# Patient Record
Sex: FEMALE | Race: OTHER | HISPANIC OR LATINO
[De-identification: names, ages, dates, MRNs, and addresses within clinical notes are randomized per-mention and may not be internally consistent; named-entity substitution may affect disease eponyms.]

---

## 2022-05-04 PROBLEM — Z00.00 ENCOUNTER FOR PREVENTIVE HEALTH EXAMINATION: Status: ACTIVE | Noted: 2022-05-04

## 2022-06-28 ENCOUNTER — APPOINTMENT (OUTPATIENT)
Dept: NEUROLOGY | Facility: CLINIC | Age: 22
End: 2022-06-28

## 2022-06-28 VITALS
DIASTOLIC BLOOD PRESSURE: 80 MMHG | SYSTOLIC BLOOD PRESSURE: 121 MMHG | TEMPERATURE: 98.7 F | WEIGHT: 160 LBS | OXYGEN SATURATION: 98 % | HEIGHT: 66 IN | BODY MASS INDEX: 25.71 KG/M2 | HEART RATE: 81 BPM

## 2022-06-28 DIAGNOSIS — E55.9 VITAMIN D DEFICIENCY, UNSPECIFIED: ICD-10-CM

## 2022-06-28 PROCEDURE — 99204 OFFICE O/P NEW MOD 45 MIN: CPT

## 2022-06-30 NOTE — REASON FOR VISIT
[Initial Evaluation] : an initial evaluation [Other: _____] : [unfilled] [Interpreters_IDNumber] : [id number] [Interpreters_FullName] : Aishwarya [TWNoteComboBox1] : Zimbabwean

## 2022-06-30 NOTE — DISCUSSION/SUMMARY
[FreeTextEntry1] : Carmen is a pleasant 21-year-old right-handed young lady with a past medical history of epilepsy. 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 Appears to be bilateral tonic clonic seizures - not clear if focal onset. On phenytoin 100 mg ER daily. No seizures in the past one year. Does not drive. Will continue phenytoin 100 mg ER daily for now but discussed that this is not an ideal medication in young woman of childbearing age. Will need to change medication but first will get EEG to get more information. \par

## 2022-06-30 NOTE — PHYSICAL EXAM
[FreeTextEntry1] : Deferred due to COVID precautions\par General: No acute distress \par Good historian \par EOMI, face activates symmetrically

## 2022-06-30 NOTE — ASSESSMENT
[FreeTextEntry1] : Please continue phenytoin 100 mg ER daily \par Please get routine and ambulatory EEG\par Please get labs \par return to clinic in one month\par MRI brain without contrast at next visit \par \par Call if seizure

## 2022-06-30 NOTE — HISTORY OF PRESENT ILLNESS
[FreeTextEntry1] : Carmen is a 21-year-old right-handed woman with a history of epilepsy. Presenting to clinic to establish care. Unfortunately, no records are available for review. She is Andor[place] speaking, originally from [place]. She presents with her boyfriend's mother. \par \par She thinks her last seizure was one year ago. She is prescribed epamin (phenytoin XR). \par \par Seizure semiology: headaches, dizziness, cramping of extremities, left eye twitching, jumping, she feels she can't respond, feels stiff in arms and legs,  body starts shaking, no loss of urine, no tongue bite \par Seizure frequency: 2 times a month for many years; sometimes she would go a couple months without a seizure and then would have 3 or 4 seizures in one day\par Intubation history: never intubated\par Seizure onset: around 4 or 5 years of age \par History of hospitalizations: once spent one week in hospital \par Seizure risk: No known history of neurocysticercosis \par \par \par Medications:\par epamin r 100 mg daily (phenytoin )\par \par no other medications\par \par Surgeries: \par none \par \par Social HIstory: \par From [place]\par lives in [place] with her partner    \par Works in house cleaning\par Does not drive\par Does not smoke, no alcohol, no drugs \par Highschool education \par \par _________________________\par \par Family History \par Mother: seizures, after children it passed\par Father: no known medical history \par Brother, sisters: none \par \par Birth history\par Term birth \par No nuchal cord \par No developmental delay \par Seizures started when she was 4 or 5 years old \par no known brain infection or encephalitis \par no head injury, no loss of consciousness \par \par \par \par \par \par \par \par \par

## 2022-06-30 NOTE — HISTORY OF PRESENT ILLNESS
[FreeTextEntry1] : Carmen is a 21-year-old right-handed woman with a history of epilepsy. Presenting to clinic to establish care. Unfortunately, no records are available for review. She is Kenyan speaking, originally from [place]. She presents with her boyfriend's mother. \par \par She thinks her last seizure was one year ago. She is prescribed epamin (phenytoin XR). \par \par Seizure semiology: headaches, dizziness, cramping of extremities, left eye twitching, jumping, she feels she can't respond, feels stiff in arms and legs,  body starts shaking, no loss of urine, no tongue bite \par Seizure frequency: 2 times a month for many years; sometimes she would go a couple months without a seizure and then would have 3 or 4 seizures in one day\par Intubation history: never intubated\par Seizure onset: around 4 or 5 years of age \par History of hospitalizations: once spent one week in hospital \par Seizure risk: No known history of neurocysticercosis \par \par \par Medications:\par epamin r 100 mg daily (phenytoin )\par \par no other medications\par \par Surgeries: \par none \par \par Social HIstory: \par From [facility]\par lives in [place] with her partner    \par Works in house cleaning\par Does not drive\par Does not smoke, no alcohol, no drugs \par Highschool education \par \par _________________________\par \par Family History \par Mother: seizures, after children it passed\par Father: no known medical history \par Brother, sisters: none \par \par Birth history\par Term birth \par No nuchal cord \par No developmental delay \par Seizures started when she was 4 or 5 years old \par no known brain infection or encephalitis \par no head injury, no loss of consciousness \par \par \par \par \par \par \par \par \par

## 2022-06-30 NOTE — REASON FOR VISIT
[Initial Evaluation] : an initial evaluation [Other: _____] : [unfilled] [Interpreters_IDNumber] : [id number] [Interpreters_FullName] : Aishwarya [TWNoteComboBox1] : Taiwanese

## 2022-07-08 ENCOUNTER — APPOINTMENT (OUTPATIENT)
Dept: NEUROLOGY | Facility: CLINIC | Age: 22
End: 2022-07-08

## 2022-07-08 DIAGNOSIS — E53.8 DEFICIENCY OF OTHER SPECIFIED B GROUP VITAMINS: ICD-10-CM

## 2022-07-08 LAB
25(OH)D3 SERPL-MCNC: 22.8 NG/ML
ALBUMIN SERPL ELPH-MCNC: 4.7 G/DL
ALP BLD-CCNC: 130 U/L
ALT SERPL-CCNC: 10 U/L
ANION GAP SERPL CALC-SCNC: 13 MMOL/L
AST SERPL-CCNC: 18 U/L
BILIRUB SERPL-MCNC: <0.2 MG/DL
BUN SERPL-MCNC: 13 MG/DL
CALCIUM SERPL-MCNC: 10.3 MG/DL
CHLORIDE SERPL-SCNC: 103 MMOL/L
CO2 SERPL-SCNC: 24 MMOL/L
CREAT SERPL-MCNC: 0.66 MG/DL
EGFR: 127 ML/MIN/1.73M2
FOLATE SERPL-MCNC: 4.3 NG/ML
GLUCOSE SERPL-MCNC: 97 MG/DL
PHENYTOIN SERPL QL: 2.6 UG/ML
POTASSIUM SERPL-SCNC: 5 MMOL/L
PROT SERPL-MCNC: 7.5 G/DL
SODIUM SERPL-SCNC: 139 MMOL/L
VIT B12 SERPL-MCNC: 568 PG/ML

## 2022-07-08 PROCEDURE — 95816 EEG AWAKE AND DROWSY: CPT

## 2022-07-09 PROCEDURE — 95708 EEG WO VID EA 12-26HR UNMNTR: CPT

## 2022-07-09 PROCEDURE — 95719 EEG PHYS/QHP EA INCR W/O VID: CPT

## 2022-07-09 PROCEDURE — 95700 EEG CONT REC W/VID EEG TECH: CPT

## 2022-07-11 ENCOUNTER — APPOINTMENT (OUTPATIENT)
Dept: NEUROLOGY | Facility: CLINIC | Age: 22
End: 2022-07-11

## 2022-08-12 ENCOUNTER — APPOINTMENT (OUTPATIENT)
Dept: NEUROLOGY | Facility: CLINIC | Age: 22
End: 2022-08-12

## 2022-08-12 VITALS
OXYGEN SATURATION: 99 % | BODY MASS INDEX: 25.71 KG/M2 | HEART RATE: 79 BPM | SYSTOLIC BLOOD PRESSURE: 118 MMHG | WEIGHT: 160 LBS | DIASTOLIC BLOOD PRESSURE: 80 MMHG | HEIGHT: 66 IN | TEMPERATURE: 98.7 F

## 2022-08-12 PROCEDURE — 99215 OFFICE O/P EST HI 40 MIN: CPT

## 2022-08-12 NOTE — DATA REVIEWED
[de-identified] : 6/2022: Ambulatory EEG/routine EEG: normal (ciganek rhythm) [de-identified] : Labs: 6/28/22: b12 568, folate 4.3 low, vitamin D. 22.8 low , comprehensive metabolic panel - alk phos 130 , creatinine 0.66 , phenytoin 2.6 low \par

## 2022-08-12 NOTE — DISCUSSION/SUMMARY
[FreeTextEntry1] : Carmen is a pleasant 21-year-old right-handed young lady with a past medical history of epilepsy. Details are unknown. Appears to be bilateral tonic clonic seizures - not clear if focal onset - no warning before. Routine and ambulatory EEG were normal - possible genetic generalized epilepsy less likely - unclear. \par \par  On phenytoin 100 mg ER daily. No seizures in the past one year. Does not drive. Plan to transition off phenytoin and add lamotrigine. Counseled extensively on seizure safety - use of barrier protection. Importance of taking folate.

## 2022-08-12 NOTE — PHYSICAL EXAM
[FreeTextEntry1] : Mental Status: alert, speech fluent. Slightly flattened affect \par CN: visual acuity, VFF, blink to confrontation \par III, IV, VI, PERRL, EOMI \par V sensation normal to light touch, pinprick\par VII normal squint vs resistance, normal smile, face symmetric \par VIII: normal hearing \par IX, X normal gag, symmetric palate, uvula raises midline \par XI normal shrug versus resistance and lateralization of head versus resistance \par XII tongue symmetric, normal strength, no tremor fasciculations \par Motor: full strength throughout \par Sensory: normal to LT \par Reflexes \par Brachioradialis, biceps, triceps 2+, patella 3+ and ankles 2+ \par Plantar flexor response bilaterally \par Coordination: no dysmetria on FNF \par Gait : normal balance and gait \par

## 2022-08-12 NOTE — ASSESSMENT
[FreeTextEntry1] : Continue Phenytoin 100 mg ER (Epamin)\par Please also start a new medication Lamotrigine (lamictal)\par \par Start lamotrigine: \par Week 1: lamotrigine 25 mg every other day\par Week 2: lamotrigine 25 mg daily \par Week 3: lamotrigine 25 mg in the morning and 50 mg at night \par Week 4: lamotrigine 50 mg in the morning and 50 mg at night \par Week 5: lamotrigine 75 mg in the morning and 50 mg at night \par Week 6: lamotrigine 75 mg twice a day\par Week 7: lamotrigine 100 mg in the morning and 75 mg at night \par Week 8: lamotrigine 100 mg bid \par \par Monitor for rash \par Stop immediately if rash\par Go the emergency room if there is a rash \par \par ________________________________________________\par \par Take Vitamin D 84150 units once a week for six weeks - then take vitamin D 1000 units daily\par Take folic acid 1 mg daily \par Please use condoms if sexually active \par \par Return to clinic in one month to see Dr. Thomas \par \par

## 2022-08-12 NOTE — REASON FOR VISIT
[Follow-Up: _____] : a [unfilled] follow-up visit [Interpreters_IDNumber] : [id number] [Interpreters_FullName] : Landy [TWNoteComboBox1] : Tanzanian

## 2022-08-12 NOTE — HISTORY OF PRESENT ILLNESS
[FreeTextEntry1] :  used. Number is [id number]\par \par Presenting to clinic with boyfriends mother. No interval events concerning for seizure. Here to review results of EEG. Last seizure more than a year ago. No warning before events. Taking phenytoin XR (epamin). Not taking high dose vitamin d, taking own supply, does not know dose. Taking folic acid. Sexually active. In future, would like children. \par ______________\par 6/28/22 \par Carmen is a 21-year-old right-handed woman with a history of epilepsy. Presenting to clinic to establish care. Unfortunately, no records are available for review. She is [place] speaking, [place] She presents with her boyfriend's mother. \par \par She thinks her last seizure was one year ago. She is prescribed epamin (phenytoin XR). \par \par Seizure semiology: headaches, dizziness, cramping of extremities, left eye twitching, jumping, she feels she can't respond, feels stiff in arms and legs,  body starts shaking, no loss of urine, no tongue bite \par Seizure frequency: 2 times a month for many years; sometimes she would go a couple months without a seizure and then would have 3 or 4 seizures in one day\par Intubation history: never intubated\par Seizure onset: around 4 or 5 years of age \par History of hospitalizations: once spent one week in hospital \par \par Medications:\par epamin r 100 mg daily (phenytoin )\par \par no other medications\par \par Surgeries: \par none \par \par Social HIstory: \[place] [facility]\par lives in [place] with her partner    \par Works in house cleaning\par Does not drive\par Does not smoke, no alcohol, no drugs \par Highschool education \par \par _________________________\par \par Family History \par Mother: seizures, after children it passed\par Father: no known medical history \par Brother, sisters: none \par \par Birth history\par Term birth \par No nuchal cord \par No developmental delay \par Seizures started when she was 4 or 5 years old \par no known brain infection or encephalitis \par no neurocysticercosis \par no head injury, no loss of consciousness \par \par \par \par \par \par \par \par \par

## 2022-09-22 ENCOUNTER — APPOINTMENT (OUTPATIENT)
Dept: NEUROLOGY | Facility: CLINIC | Age: 22
End: 2022-09-22

## 2022-09-22 VITALS
BODY MASS INDEX: 25.71 KG/M2 | HEIGHT: 66 IN | WEIGHT: 160 LBS | SYSTOLIC BLOOD PRESSURE: 107 MMHG | DIASTOLIC BLOOD PRESSURE: 74 MMHG | HEART RATE: 80 BPM

## 2022-09-22 PROCEDURE — 99215 OFFICE O/P EST HI 40 MIN: CPT

## 2022-09-22 NOTE — ASSESSMENT
[FreeTextEntry1] : Continue to increase lamotrigine\par \par Week 1: lamotrigine 75 mg in morning and 50 mg at night\par Week 2: lamotrigine 75 mg in morning and 75 mg at night\par Week 3: lamotrigine 100 mg in morning and 75 mg at night \par Week 4: lamotrigine 100 mg twice a day \par Continue at this dose \par __________________________\par \par Continue phenytoin 100 mg ER daily (Epamin)\par \par Take folic acid 1 mg daily \par \par Take vitamin D 95978 units once a week for another six weeks\par Please use condoms or birth control if sexually active \par \par Return to clinic in one month - permission to overbook

## 2022-09-22 NOTE — PHYSICAL EXAM
[FreeTextEntry1] : Mental Status: alert, speech fluent. Normal affect. \par CN: visual acuity, VFF, blink to confrontation \par III, IV, VI, PERRL, EOMI \par V sensation normal to light touch, pinprick\par VII normal squint vs resistance, normal smile, face symmetric \par VIII: normal hearing \par IX, X normal gag, symmetric palate, uvula raises midline \par XI normal shrug versus resistance and lateralization of head versus resistance \par XII tongue symmetric, normal strength, no tremor fasciculations \par Motor: full strength throughout \par Sensory: normal to LT \par Reflexes \par Brachioradialis, biceps, triceps 2+, patella 2+ and ankles 2+ \par Plantar flexor response bilaterally \par Coordination: no dysmetria on FNF \par Gait : normal balance and gait \par

## 2022-09-22 NOTE — HISTORY OF PRESENT ILLNESS
[FreeTextEntry1] : Last seen in clinic on 8/12/22. Doing well. No interval events concerning for seizure. Taking lamotrigine 75 mg in the morning and 50 mg at night. No rash, no side effects.  Also taking Epamin (although level was low) - denies missing doses.  Takes folic acid 1 mg daily. Takes vitamin D 74004 q weekly. No seizures. Not driving. \par ______________________________________________________________\par  used. Number is [id number]\par \par Presenting to clinic with boyfriends mother. No interval events concerning for seizure. Here to review results of EEG. Last seizure more than a year ago. No warning before events. Taking phenytoin XR (epamin). Not taking high dose vitamin d, taking own supply, does not know dose. Taking folic acid. Sexually active. In future, would like children. \par ______________\par 6/28/22 \par Carmen is a 21-year-old right-handed woman with a history of epilepsy. Presenting to clinic to establish care. Unfortunately, no records are available for review. She is Lao speaking, originally from [facility] [place]. She presents with her boyfriend's mother. \par \par She thinks her last seizure was one year ago. She is prescribed epamin (phenytoin XR). \par \par Seizure semiology: headaches, dizziness, cramping of extremities, left eye twitching, jumping, she feels she can't respond, feels stiff in arms and legs,  body starts shaking, no loss of urine, no tongue bite \par Seizure frequency: 2 times a month for many years; sometimes she would go a couple months without a seizure and then would have 3 or 4 seizures in one day\par Intubation history: never intubated\par Seizure onset: around 4 or 5 years of age \par History of hospitalizations: once spent one week in hospital \par \par Medications:\par epamin r 100 mg daily (phenytoin )\par \par no other medications\par \par Surgeries: \par none \par \par Social HIstory: \par From [place]\par lives in [place] with her partner    \par Works in house cleaning\par Does not drive\par Does not smoke, no alcohol, no drugs \par Highschool education \par \par _________________________\par \par Family History \par Mother: seizures, after children it passed\par Father: no known medical history \par Brother, sisters: none \par \par Birth history\par Term birth \par No nuchal cord \par No developmental delay \par Seizures started when she was 4 or 5 years old \par no known brain infection or encephalitis \par no neurocysticercosis \par no head injury, no loss of consciousness \par \par \par \par \par \par \par \par \par

## 2022-09-22 NOTE — REASON FOR VISIT
[Follow-Up: _____] : a [unfilled] follow-up visit [FreeTextEntry1] : Follow Up  [Interpreters_IDNumber] : [id number] [Interpreters_FullName] : Georgina  [FreeTextEntry3] : s [TWNoteComboBox1] : Zambian

## 2022-10-28 ENCOUNTER — APPOINTMENT (OUTPATIENT)
Dept: NEUROLOGY | Facility: CLINIC | Age: 22
End: 2022-10-28

## 2022-10-28 VITALS
HEIGHT: 66 IN | SYSTOLIC BLOOD PRESSURE: 117 MMHG | WEIGHT: 160 LBS | HEART RATE: 84 BPM | DIASTOLIC BLOOD PRESSURE: 82 MMHG | BODY MASS INDEX: 25.71 KG/M2

## 2022-10-28 DIAGNOSIS — G40.909 EPILEPSY, UNSPECIFIED, NOT INTRACTABLE, W/OUT STATUS EPILEPTICUS: ICD-10-CM

## 2022-10-28 PROCEDURE — 99215 OFFICE O/P EST HI 40 MIN: CPT

## 2022-10-28 RX ORDER — PHENYTOIN SODIUM 100 MG/1
100 CAPSULE ORAL DAILY
Qty: 90 | Refills: 3 | Status: DISCONTINUED | COMMUNITY
Start: 2022-06-28 | End: 2022-10-28

## 2022-10-28 NOTE — HISTORY OF PRESENT ILLNESS
[FreeTextEntry1] : Last seen in clinic on 9/22/2022. Reports feeling dizzy. Has right-sided headaches. Taking phenytoin 100 mg daily (epamin) and lamotrigine 200 mg twice a day. She self-increased dose of lamotrigine and is taking 200 mg in the morning and 200 mg at night. \par \par No clinical events concerning for seizure. Also taking vitamin D and folic acid. Does not drive. \par \par Medications include: \par lamotrigine 100 mg twice a day \par phenytoin 100 mg in the morning. \par ______________________\par 9/22/22 \par Last seen in clinic on 8/12/22. Doing well. No interval events concerning for seizure. Taking lamotrigine 75 mg in the morning and 50 mg at night. No rash, no side effects.  Also taking Epamin (although level was low) - denies missing doses.  Takes folic acid 1 mg daily. Takes vitamin D 64665 q weekly. No seizures. Not driving. \par ______________________________________________________________\par  used. Number is [id number]\par \par Presenting to clinic with boyfriends mother. No interval events concerning for seizure. Here to review results of EEG. Last seizure more than a year ago. No warning before events. Taking phenytoin XR (epamin). Not taking high dose vitamin d, taking own supply, does not know dose. Taking folic acid. Sexually active. In future, would like children. \par ______________\par 6/28/22 \par Carmen is a 21-year-old right-handed woman with a history of epilepsy. Presenting to clinic to establish care. Unfortunately, no records are available for review. She is [place] speaking, [place] She presents with her boyfriend's mother. \par \par She thinks her last seizure was one year ago. She is prescribed epamin (phenytoin XR). \par \par Seizure semiology: headaches, dizziness, cramping of extremities, left eye twitching, jumping, she feels she can't respond, feels stiff in arms and legs,  body starts shaking, no loss of urine, no tongue bite \par Seizure frequency: 2 times a month for many years; sometimes she would go a couple months without a seizure and then would have 3 or 4 seizures in one day\par Intubation history: never intubated\par Seizure onset: around 4 or 5 years of age \par History of hospitalizations: once spent one week in hospital \par \par Medications:\par epamin r 100 mg daily (phenytoin )\par \par no other medications\par \par Surgeries: \par none \par \par Social HIstory: \par From [place]\par lives in [place] with her partner    \par Works in house cleaning\par Does not drive\par Does not smoke, no alcohol, no drugs \par Highschool education \par \par _________________________\par \par Family History \par Mother: seizures, after children it passed\par Father: no known medical history \par Brother, sisters: none \par \par Birth history\par Term birth \par No nuchal cord \par No developmental delay \par Seizures started when she was 4 or 5 years old \par no known brain infection or encephalitis \par no neurocysticercosis \par no head injury, no loss of consciousness \par \par \par \par \par \par \par \par \par

## 2022-10-28 NOTE — ASSESSMENT
[FreeTextEntry1] : Please stop epamin (phenytoin)\par Please take lamotrigine 200 mg in the morning and 200 mg at night \par I will send the new prescription to the pharmacy (lamotrigine 100 mg tablets - take 2 tablets in the morning and 2 tablets at night)\par Continue vitamin D 1000 units daily \par Continue folic acid 1 mg daily \par Return to clinic in two weeks to see Dr. India Thomas (permission to overbook)

## 2022-10-28 NOTE — PHYSICAL EXAM
[FreeTextEntry1] : Mental Status: alert, speech fluent. Normal affect. \par CN: visual acuity, VFF, blink to confrontation \par III, IV, VI, PERRL, EOMI \par V sensation normal to light touch, pinprick\par VII normal squint vs resistance, normal smile, face symmetric \par VIII: normal hearing \par IX, X normal gag, symmetric palate, uvula raises midline \par XI normal shrug versus resistance and lateralization of head versus resistance \par XII tongue symmetric, normal strength, no tremor fasciculations \par Motor: full strength throughout \par Sensory: normal to LT \par Coordination: no dysmetria on FNF \par Gait : normal balance and gait \par

## 2022-10-28 NOTE — DISCUSSION/SUMMARY
[FreeTextEntry1] : Carmen is a pleasant 21-year-old right-handed young lady with a past medical history of epilepsy. Details are unknown. Appears to be bilateral tonic clonic seizures - not clear if focal onset - no warning before. Routine and ambulatory EEG were normal -genetic generalized epilepsy less likely - (would have most likely seen epileptiform discharges on EEG if it was generalized epilepsy). \par \par Self increased lamotrigine to 200 mg bid (was supposed to be on 100 mg bid) and continued phenytoin. This is likely why she is dizzy with headaches. She is on two medications with sodium channel blocking properties. \par \par Discussed with her importance of taking medication exactly as prescribed. \par

## 2022-10-28 NOTE — DATA REVIEWED
[de-identified] : 6/2022: Ambulatory EEG/routine EEG: normal (ciganek rhythm) [de-identified] : Labs: 6/28/22: b12 568, folate 4.3 low, vitamin D. 22.8 low , comprehensive metabolic panel - alk phos 130 , creatinine 0.66 , phenytoin 2.6 low \par

## 2022-11-08 ENCOUNTER — APPOINTMENT (OUTPATIENT)
Dept: NEUROLOGY | Facility: CLINIC | Age: 22
End: 2022-11-08

## 2022-11-08 VITALS
WEIGHT: 160 LBS | DIASTOLIC BLOOD PRESSURE: 86 MMHG | SYSTOLIC BLOOD PRESSURE: 123 MMHG | BODY MASS INDEX: 25.71 KG/M2 | OXYGEN SATURATION: 98 % | TEMPERATURE: 98.7 F | HEIGHT: 66 IN | HEART RATE: 89 BPM

## 2022-11-08 PROCEDURE — 99215 OFFICE O/P EST HI 40 MIN: CPT

## 2022-11-09 LAB
25(OH)D3 SERPL-MCNC: 46.9 NG/ML
ALBUMIN SERPL ELPH-MCNC: 4.9 G/DL
ALP BLD-CCNC: 114 U/L
ALT SERPL-CCNC: 8 U/L
ANION GAP SERPL CALC-SCNC: 11 MMOL/L
AST SERPL-CCNC: 16 U/L
BILIRUB SERPL-MCNC: 0.3 MG/DL
BUN SERPL-MCNC: 15 MG/DL
CALCIUM SERPL-MCNC: 10.5 MG/DL
CHLORIDE SERPL-SCNC: 104 MMOL/L
CO2 SERPL-SCNC: 26 MMOL/L
CREAT SERPL-MCNC: 0.8 MG/DL
EGFR: 107 ML/MIN/1.73M2
FOLATE SERPL-MCNC: 17.6 NG/ML
GLUCOSE SERPL-MCNC: 105 MG/DL
POTASSIUM SERPL-SCNC: 4.1 MMOL/L
PROT SERPL-MCNC: 7.6 G/DL
SODIUM SERPL-SCNC: 140 MMOL/L
VIT B12 SERPL-MCNC: 643 PG/ML

## 2022-11-09 NOTE — REASON FOR VISIT
[Follow-Up: _____] : a [unfilled] follow-up visit [Interpreters_IDNumber] : [id number] [Interpreters_FullName] : Starr [TWNoteComboBox1] : Namibian

## 2022-11-09 NOTE — HISTORY OF PRESENT ILLNESS
[FreeTextEntry1] : Last seen in clinic on 10/28/22. Taking lamotrigine 200 mg bid. Not taking phenytoin anymore. Dizziness is much improved. Headaches are improved. No seizures. \par \par Current Medications:\par lamotrigine 200 mg bid\par folate 1 mg daily\par vitamin D 1000 units qd \par ___________________\par 10/28/22 \par Last seen in clinic on 9/22/2022. Reports feeling dizzy. Has right-sided headaches. Taking phenytoin 100 mg daily (epamin) and lamotrigine 200 mg twice a day. She self-increased dose of lamotrigine and is taking 200 mg in the morning and 200 mg at night. \par \par No clinical events concerning for seizure. Also taking vitamin D and folic acid. Does not drive. \par \par Medications include: \par lamotrigine 100 mg twice a day \par phenytoin 100 mg in the morning. \par ______________________\par 9/22/22 \par Last seen in clinic on 8/12/22. Doing well. No interval events concerning for seizure. Taking lamotrigine 75 mg in the morning and 50 mg at night. No rash, no side effects.  Also taking Epamin (although level was low) - denies missing doses.  Takes folic acid 1 mg daily. Takes vitamin D 91596 q weekly. No seizures. Not driving. \par ______________________________________________________________\par  used. Number is [id number]\par \par Presenting to clinic with boyfriends mother. No interval events concerning for seizure. Here to review results of EEG. Last seizure more than a year ago. No warning before events. Taking phenytoin XR (epamin). Not taking high dose vitamin d, taking own supply, does not know dose. Taking folic acid. Sexually active. In future, would like children. \par ______________\par 6/28/22 \par Carmen is a 21-year-old right-handed woman with a history of epilepsy. Presenting to clinic to establish care. Unfortunately, no records are available for review. She is French speaking, originally from [place]. She presents with her boyfriend's mother. \par \par She thinks her last seizure was one year ago. She is prescribed epamin (phenytoin XR). \par \par Seizure semiology: headaches, dizziness, cramping of extremities, left eye twitching, jumping, she feels she can't respond, feels stiff in arms and legs,  body starts shaking, no loss of urine, no tongue bite \par Seizure frequency: 2 times a month for many years; sometimes she would go a couple months without a seizure and then would have 3 or 4 seizures in one day\par Intubation history: never intubated\par Seizure onset: around 4 or 5 years of age \par History of hospitalizations: once spent one week in hospital \par \par Medications:\par epamin r 100 mg daily (phenytoin )\par \par no other medications\par \par Surgeries: \par none \par \par Social HIstory: \par From [facility]\par lives in [place] with her partner    \par Works in house cleaning\par Does not drive\par Does not smoke, no alcohol, no drugs \par Highschool education \par \par _________________________\par \par Family History \par Mother: seizures, after children it passed\par Father: no known medical history \par Brother, sisters: none \par \par Birth history\par Term birth \par No nuchal cord \par No developmental delay \par Seizures started when she was 4 or 5 years old \par no known brain infection or encephalitis \par no neurocysticercosis \par no head injury, no loss of consciousness \par \par \par \par \par \par \par \par \par

## 2022-11-09 NOTE — DATA REVIEWED
[de-identified] : 6/2022: Ambulatory EEG/routine EEG: normal (ciganek rhythm) [de-identified] : Labs: 6/28/22: b12 568, folate 4.3 low, vitamin D. 22.8 low , comprehensive metabolic panel - alk phos 130 , creatinine 0.66 \par phenytoin 2.6 low \par

## 2022-11-09 NOTE — ASSESSMENT
[FreeTextEntry1] : Please continue lamotrigine 200 mg twice a day \par Please continue folic acid 1 mg daily \par Please continue vitamin D 1000 units daily\par MRI brain at next visit \par RTC in 2 months to see Dr. Osborne \par Please get labwork today \par . \par

## 2022-11-09 NOTE — DISCUSSION/SUMMARY
[FreeTextEntry1] : Carmen is a pleasant 21-year-old right-handed young lady with a past medical history of epilepsy. Details are unknown. Appears to be bilateral tonic clonic seizures - not clear if focal onset - no warning before. Routine and ambulatory EEG were normal -genetic generalized epilepsy less likely - (would have most likely seen epileptiform discharges on EEG if it was generalized epilepsy). \par \par Self increased lamotrigine to 200 mg bid (was supposed to be on 100 mg bid) and continued phenytoin. This is likely why she is dizzy with headaches. She is on two medications with sodium channel blocking properties. \par \par At 10/2022 visit she stopped phenytoin. Feels much better on lamotrigine 200 mg bid. Transient dizziness. Will get AED levels. \par \par Discussed with her importance of taking medication exactly as prescribed.

## 2022-11-09 NOTE — PHYSICAL EXAM
[FreeTextEntry1] : Mental Status: alert, speech fluent. Normal affect. \par CN: visual acuity, VFF, blink to confrontation \par III, IV, VI, PERRL, EOMI, no nystagmus \par V sensation normal to light touch, pinprick\par VII normal squint vs resistance, normal smile, face symmetric \par VIII: normal hearing \par IX, X normal gag, symmetric palate, uvula raises midline \par XI normal shrug versus resistance and lateralization of head versus resistance \par XII tongue symmetric, normal strength, no tremor fasciculations \par Motor: full strength throughout \par Sensory: normal to LT \par Coordination: no dysmetria on FNF \par Gait : normal balance and gait \par

## 2022-11-10 LAB — LAMOTRIGINE SERPL-MCNC: 8.4 UG/ML

## 2023-01-11 ENCOUNTER — APPOINTMENT (OUTPATIENT)
Dept: NEUROLOGY | Facility: CLINIC | Age: 23
End: 2023-01-11
Payer: MEDICAID

## 2023-01-11 ENCOUNTER — NON-APPOINTMENT (OUTPATIENT)
Age: 23
End: 2023-01-11

## 2023-01-11 VITALS
HEART RATE: 77 BPM | DIASTOLIC BLOOD PRESSURE: 79 MMHG | SYSTOLIC BLOOD PRESSURE: 122 MMHG | WEIGHT: 160 LBS | BODY MASS INDEX: 25.71 KG/M2 | HEIGHT: 66 IN | OXYGEN SATURATION: 97 %

## 2023-01-11 PROCEDURE — 99214 OFFICE O/P EST MOD 30 MIN: CPT

## 2023-01-11 RX ORDER — CHROMIUM 200 MCG
25 MCG TABLET ORAL
Qty: 90 | Refills: 3 | Status: DISCONTINUED | COMMUNITY
Start: 2022-10-28 | End: 2023-01-11

## 2023-01-11 RX ORDER — FOLIC ACID 1 MG/1
1 TABLET ORAL
Qty: 90 | Refills: 2 | Status: DISCONTINUED | COMMUNITY
Start: 2022-10-28 | End: 2023-01-11

## 2023-01-11 NOTE — PHYSICAL EXAM
[FreeTextEntry1] : Mental Status: alert, speech fluent. Normal affect. \par CN: visual acuity, VFF \par III, IV, VI, PERRL, EOMI, no nystagmus \par V sensation normal to light touch \par VII normal squint vs resistance, normal smile, face symmetric \par VIII: normal hearing \par IX, X normal gag, symmetric palate, uvula raises midline \par XI normal shrug versus resistance and lateralization of head versus resistance \par XII tongue symmetric, normal strength, no tremor fasciculations \par Motor: no drift in upper or lower extremities, mild sustention tremor\par Gait : normal balance and gait \par

## 2023-01-11 NOTE — REASON FOR VISIT
[Follow-Up: _____] : a [unfilled] follow-up visit [FreeTextEntry1] : er seizure f/u [Interpreters_IDNumber] : [id number] [Interpreters_FullName] : shashi [FreeTextEntry3] : Taj[place]

## 2023-01-11 NOTE — DISCUSSION/SUMMARY
[FreeTextEntry1] : Carmen is a pleasant 22-year-old right-handed young lady with a past medical history of epilepsy. Details are unknown. Appears to be bilateral tonic clonic seizures - not clear if focal onset - no warning before. Routine and ambulatory EEG were normal -genetic generalized epilepsy less likely - (would have most likely seen epileptiform discharges on EEG if it was generalized epilepsy). \par \par Self increased lamotrigine to 200 mg bid (was supposed to be on 100 mg bid) and continued phenytoin. This is likely why she is dizzy with headaches. She is on two medications with sodium channel blocking properties. \par \par At 10/2022 visit she stopped phenytoin. Feels much better on lamotrigine 200 mg bid. AED level within range, 8.4. \par \par Discussed with her importance of taking medication exactly as prescribed. \par \par

## 2023-01-11 NOTE — DATA REVIEWED
[de-identified] : 7/2022: Ambulatory EEG/routine EEG: normal (ciganek rhythm) [de-identified] : 11/8/22: b12 643, folate 17.6, creatinine 0.8, vitamin d 46.9, lamotrigine 8.4 \par \par Labs: 6/28/22: b12 568, folate 4.3 low, vitamin D. 22.8 low , comprehensive metabolic panel - alk phos 130 , creatinine 0.66 \par phenytoin 2.6 low \par

## 2023-01-11 NOTE — HISTORY OF PRESENT ILLNESS
[FreeTextEntry1] :  number: [id number] \par \par Last seen in clinic on 10/10/22. Doing well. No interval events concerning for seizure. No side-effects from the medications. Does have episodes of anxiety. She is unsure if they are seizures. No longer taking phenytoin. \par Has a little tremor but no more incoordination/ataxia/headaches. \par \par Current Medications: \par lamotrigine 200 mg bid \par folate 1 mg daily \par vitamin d 1000 units daily \par ____\par 11/8/22 \par Last seen in clinic on 10/28/22. Taking lamotrigine 200 mg bid. Not taking phenytoin anymore. Dizziness is much improved. Headaches are improved. No seizures. \par \par Current Medications:\par lamotrigine 200 mg bid\par folate 1 mg daily\par vitamin D 1000 units qd \par ___________________\par 10/28/22 \par Last seen in clinic on 9/22/2022. Reports feeling dizzy. Has right-sided headaches. Taking phenytoin 100 mg daily (epamin) and lamotrigine 200 mg twice a day. She self-increased dose of lamotrigine and is taking 200 mg in the morning and 200 mg at night. \par \par No clinical events concerning for seizure. Also taking vitamin D and folic acid. Does not drive. \par \par Medications include: \par lamotrigine 100 mg twice a day \par phenytoin 100 mg in the morning. \par ______________________\par 9/22/22 \par Last seen in clinic on 8/12/22. Doing well. No interval events concerning for seizure. Taking lamotrigine 75 mg in the morning and 50 mg at night. No rash, no side effects.  Also taking Epamin (although level was low) - denies missing doses.  Takes folic acid 1 mg daily. Takes vitamin D 41154 q weekly. No seizures. Not driving. \par ______________________________________________________________\par  used. Number is [id number]\par \par Presenting to clinic with boyfriends mother. No interval events concerning for seizure. Here to review results of EEG. Last seizure more than a year ago. No warning before events. Taking phenytoin XR (epamin). Not taking high dose vitamin d, taking own supply, does not know dose. Taking folic acid. Sexually active. In future, would like children. \par ______________\par 6/28/22 \par Carmen is a 21-year-old right-handed woman with a history of epilepsy. Presenting to clinic to establish care. Unfortunately, no records are available for review. She is Kiswahili speaking, originally from [facility]. She presents with her boyfriend's mother. \par \par She thinks her last seizure was one year ago. She is prescribed epamin (phenytoin XR). \par \par Seizure semiology: headaches, dizziness, cramping of extremities, left eye twitching, jumping, she feels she can't respond, feels stiff in arms and legs,  body starts shaking, no loss of urine, no tongue bite \par Seizure frequency: 2 times a month for many years; sometimes she would go a couple months without a seizure and then would have 3 or 4 seizures in one day\par Intubation history: never intubated\par Seizure onset: around 4 or 5 years of age \par History of hospitalizations: once spent one week in hospital \par \par Medications:\par epamin r 100 mg daily (phenytoin )\par \par no other medications\par \par Surgeries: \par none \par \par Social HIstory: \par From [facility]\par lives in [place] with her partner    \par Works in house cleaning\par Does not drive\par Does not smoke, no alcohol, no drugs \par Highschool education \par \par _________________________\par \par Family History \par Mother: seizures, after children it passed\par Father: no known medical history \par Brother, sisters: none \par \par Birth history\par Term birth \par No nuchal cord \par No developmental delay \par Seizures started when she was 4 or 5 years old \par no known brain infection or encephalitis \par no neurocysticercosis \par no head injury, no loss of consciousness \par \par \par \par \par \par \par \par \par

## 2023-01-11 NOTE — ASSESSMENT
[FreeTextEntry1] : Please get an MRI brain - epilepsy protocol \par continue lamotrigine 200 mg twice a day\par continue folic acid 1 mg daily\par continue vitamin d 1000 units daily\par Return to clinic in three months \par \par Will consider bringing in for EMU - stopping lamotrigine and observing EEG for 2-3 days.  \par

## 2023-01-24 ENCOUNTER — RESULT REVIEW (OUTPATIENT)
Age: 23
End: 2023-01-24

## 2023-04-11 ENCOUNTER — APPOINTMENT (OUTPATIENT)
Dept: NEUROLOGY | Facility: CLINIC | Age: 23
End: 2023-04-11
Payer: MEDICAID

## 2023-04-11 VITALS
OXYGEN SATURATION: 98 % | BODY MASS INDEX: 25.71 KG/M2 | HEIGHT: 66 IN | SYSTOLIC BLOOD PRESSURE: 128 MMHG | HEART RATE: 80 BPM | WEIGHT: 160 LBS | DIASTOLIC BLOOD PRESSURE: 77 MMHG

## 2023-04-11 PROCEDURE — 99213 OFFICE O/P EST LOW 20 MIN: CPT

## 2023-04-11 NOTE — PHYSICAL EXAM
[FreeTextEntry1] : Mental Status: alert, speech fluent. Normal affect. \par CN: visual acuity, VFF \par III, IV, VI, PERRL, EOMI, no nystagmus \par V sensation normal to light touch \par VII normal squint vs resistance, normal smile, face symmetric \par VIII: normal hearing \par IX, X normal gag, symmetric palate, uvula raises midline \par XI normal shrug versus resistance and lateralization of head versus resistance \par XII tongue symmetric, normal strength, no tremor fasciculations \par Motor: no drift in upper or lower extremities, no tremor\par Gait : normal balance and gait \par

## 2023-04-11 NOTE — DISCUSSION/SUMMARY
[FreeTextEntry1] : Carmen is a pleasant 22-year-old right-handed young lady with a past medical history of epilepsy. Details are unknown. Appears to be bilateral tonic clonic seizures - not clear if focal onset - no warning before. Routine and ambulatory EEG were normal -genetic generalized epilepsy less likely - (would have most likely seen epileptiform discharges on EEG if it was generalized epilepsy). \par \par Self increased lamotrigine to 200 mg bid (was supposed to be on 100 mg bid) and continued phenytoin. This is likely why she was dizzy with headaches. She is on two medications with sodium channel blocking properties. \par \par At 10/2022 visit she stopped phenytoin. Feels much better on lamotrigine 200 mg bid. AED level within range, 8.4. \par \par Discussed with her importance of taking medication exactly as prescribed. Will repeat EEG \par \par

## 2023-04-11 NOTE — ASSESSMENT
[FreeTextEntry1] : Please get EEG \par Please continue lamotrigine 200 mg twice a day\par Please continue vitamin D 1000 units daily\par Continue folic acid 1 mg daily\par Lamotrigine level at next visit\par I will send refill to pharmacy\par \par Return to clinic in four months to see Dr. Osborne. \par \par

## 2023-04-11 NOTE — HISTORY OF PRESENT ILLNESS
[FreeTextEntry1] : Last seen in clinic on 1/11/2023. Doing well. Taking lamotrigine 200 mg twice a day. Sometimes has anxiety/sensation of panic. No convulsive seizures. \par \par Medications include: \par lamotrigine 200 mg bid \par folic acid 1 mg \par vitamin d \par _________________________________\par 1/11/23 \par  number: [id number] \par \par Last seen in clinic on 10/10/22. Doing well. No interval events concerning for seizure. No side-effects from the medications. Does have episodes of anxiety. She is unsure if they are seizures. No longer taking phenytoin. \par Has a little tremor but no more incoordination/ataxia/headaches. \par \par Current Medications: \par lamotrigine 200 mg bid \par folate 1 mg daily \par vitamin d 1000 units daily \par ____\par 11/8/22 \par Last seen in clinic on 10/28/22. Taking lamotrigine 200 mg bid. Not taking phenytoin anymore. Dizziness is much improved. Headaches are improved. No seizures. \par \par Current Medications:\par lamotrigine 200 mg bid\par folate 1 mg daily\par vitamin D 1000 units qd \par ___________________\par 10/28/22 \par Last seen in clinic on 9/22/2022. Reports feeling dizzy. Has right-sided headaches. Taking phenytoin 100 mg daily (epamin) and lamotrigine 200 mg twice a day. She self-increased dose of lamotrigine and is taking 200 mg in the morning and 200 mg at night. \par \par No clinical events concerning for seizure. Also taking vitamin D and folic acid. Does not drive. \par \par Medications include: \par lamotrigine 100 mg twice a day \par phenytoin 100 mg in the morning. \par ______________________\par 9/22/22 \par Last seen in clinic on 8/12/22. Doing well. No interval events concerning for seizure. Taking lamotrigine 75 mg in the morning and 50 mg at night. No rash, no side effects.  Also taking Epamin (although level was low) - denies missing doses.  Takes folic acid 1 mg daily. Takes vitamin D 07601 q weekly. No seizures. Not driving. \par ______________________________________________________________\par  used. Number is [id number]\par \par Presenting to clinic with boyfriends mother. No interval events concerning for seizure. Here to review results of EEG. Last seizure more than a year ago. No warning before events. Taking phenytoin XR (epamin). Not taking high dose vitamin d, taking own supply, does not know dose. Taking folic acid. Sexually active. In future, would like children. \par ______________\par 6/28/22 \par Carmen is a 21-year-old right-handed woman with a history of epilepsy. Presenting to clinic to establish care. Unfortunately, no records are available for review. She is [place] speaking, originally from [facility]. She presents with her boyfriend's mother. \par \par She thinks her last seizure was one year ago. She is prescribed epamin (phenytoin XR). \par \par Seizure semiology: headaches, dizziness, cramping of extremities, left eye twitching, jumping, she feels she can't respond, feels stiff in arms and legs,  body starts shaking, no loss of urine, no tongue bite \par Seizure frequency: 2 times a month for many years; sometimes she would go a couple months without a seizure and then would have 3 or 4 seizures in one day\par Intubation history: never intubated\par Seizure onset: around 4 or 5 years of age \par History of hospitalizations: once spent one week in hospital \par \par Medications:\par epamin 100 mg daily (phenytoin )\par \par no other medications\par \par Surgeries: \par none \par \par Social HIstory: \par From [facility]\par lives in [place] with her partner    \par Works in house cleaning\par Does not drive\par Does not smoke, no alcohol, no drugs \par Highschool education \par \par _________________________\par \par Family History \par Mother: seizures, after children it passed\par Father: no known medical history \par Brother, sisters: none \par \par Birth history\par Term birth \par No nuchal cord \par No developmental delay \par Seizures started when she was 4 or 5 years old \par no known brain infection or encephalitis \par no neurocysticercosis \par no head injury, no loss of consciousness \par \par \par \par \par \par \par \par \par

## 2023-04-11 NOTE — DATA REVIEWED
[de-identified] : 1/24/23: MRI brain: asymmetric prominence of right temporal horn which is non-specific. Hippocampi are \par symmetric in signal and morphology.  [de-identified] : 7/2022: Ambulatory EEG/routine EEG: normal (ciganek rhythm) [de-identified] : 11/8/22: b12 643, folate 17.6, creatinine 0.8, vitamin d 46.9, lamotrigine 8.4 \par \par Labs: 6/28/22: b12 568, folate 4.3 low, vitamin D. 22.8 low , comprehensive metabolic panel - alk phos 130 , creatinine 0.66 \par phenytoin 2.6 low \par

## 2023-05-01 ENCOUNTER — RESULT REVIEW (OUTPATIENT)
Age: 23
End: 2023-05-01

## 2023-05-11 ENCOUNTER — APPOINTMENT (OUTPATIENT)
Dept: NEUROLOGY | Facility: CLINIC | Age: 23
End: 2023-05-11
Payer: MEDICAID

## 2023-05-11 PROCEDURE — 95816 EEG AWAKE AND DROWSY: CPT

## 2023-05-12 PROCEDURE — 95700 EEG CONT REC W/VID EEG TECH: CPT

## 2023-05-12 PROCEDURE — 95719 EEG PHYS/QHP EA INCR W/O VID: CPT

## 2023-05-12 PROCEDURE — 95708 EEG WO VID EA 12-26HR UNMNTR: CPT

## 2023-05-15 ENCOUNTER — APPOINTMENT (OUTPATIENT)
Dept: NEUROLOGY | Facility: CLINIC | Age: 23
End: 2023-05-15

## 2023-08-11 ENCOUNTER — APPOINTMENT (OUTPATIENT)
Dept: NEUROLOGY | Facility: CLINIC | Age: 23
End: 2023-08-11
Payer: MEDICAID

## 2023-08-11 VITALS
BODY MASS INDEX: 25.71 KG/M2 | HEART RATE: 80 BPM | SYSTOLIC BLOOD PRESSURE: 112 MMHG | OXYGEN SATURATION: 98 % | DIASTOLIC BLOOD PRESSURE: 73 MMHG | HEIGHT: 66 IN | WEIGHT: 160 LBS

## 2023-08-11 DIAGNOSIS — G44.209 TENSION-TYPE HEADACHE, UNSPECIFIED, NOT INTRACTABLE: ICD-10-CM

## 2023-08-11 PROCEDURE — 99215 OFFICE O/P EST HI 40 MIN: CPT

## 2023-08-14 PROBLEM — G44.209 ACUTE NON INTRACTABLE TENSION-TYPE HEADACHE: Status: ACTIVE | Noted: 2023-08-14

## 2023-08-14 RX ORDER — CHROMIUM 200 MCG
25 MCG TABLET ORAL
Qty: 90 | Refills: 3 | Status: DISCONTINUED | COMMUNITY
Start: 2023-01-25 | End: 2023-08-14

## 2023-08-14 NOTE — ASSESSMENT
[FreeTextEntry1] : Please get labwork (lamotrigine) - try to get the level first thing in the morning before you take your morning dose of lamotrigine  Please continue lamotrigine 200 mg twice a day for now (we may decrease the dose at the next visit) Please take vitamin D 1000 units daily Ibuprofen prn headache Take folic acid 1 mg daily I will send magnesium glycinate 200 mg - you can take this every night for headaches and sleep I will send riboflavin (vitamin b2) 400 mg daily for headache Drink 2 Liters of water a day Don't skip meals 7-8 hours of sleep a night Return to clinic in December  Call if seizure Seizure Safety:  Wear a helmet when biking or horseback riding No unsupervised swimming Showers rather than baths - no bath alone - risk of drowning Adjust the temperature on hot water heater to avoid scalding If uncontrolled seizures, use microwave rather than stovetop Dont lock door in bathroom, bedroom or on places If fall off bed with seizures, try sleeping with mattress on the floor Use soft or padded furniture Avoid high ladders Take medication as prescribed Follow driving regulations of people with epilepsy. Please visit [employer]ile[employer] [facility] : [email or web address]://www.epilepsy.com/.

## 2023-08-14 NOTE — HISTORY OF PRESENT ILLNESS
[FreeTextEntry1] : Last seen in clinic in 4/2023. No interval events concerning for seizures. Does report dizziness which is the most in the afternoon and some headaches. Headaches occur two times a week. They can occur at any time of day. They can travel throughout her head, not localized. + nausea, no vomiting. Photophobia, no phonophobia. Worse with menstrual cycle and sleep deprivation.  Most nights, tries to sleep seven hours. Does not stay hydrated. Using protection, sexually active. Takes vitamin d and folic acid. Here to review results of ambulatory EEG.    Current medications include: lamotrigine 200 mg bid folic acid 1 mg  vitamin d ___________________________ 4/11/23  Last seen in clinic on 1/11/2023. Doing well. Taking lamotrigine 200 mg twice a day. Sometimes has anxiety/sensation of panic. No convulsive seizures.  Medications include:  lamotrigine 200 mg bid  folic acid 1 mg  vitamin d  _________________________________ 1/11/23   number: [id number]   Last seen in clinic on 10/10/22. Doing well. No interval events concerning for seizure. No side-effects from the medications. Does have episodes of anxiety. She is unsure if they are seizures. No longer taking phenytoin.  Has a little tremor but no more incoordination/ataxia/headaches.   Current Medications:  lamotrigine 200 mg bid  folate 1 mg daily  vitamin d 1000 units daily  ____ 11/8/22  Last seen in clinic on 10/28/22. Taking lamotrigine 200 mg bid. Not taking phenytoin anymore. Dizziness is much improved. Headaches are improved. No seizures.   Current Medications: lamotrigine 200 mg bid folate 1 mg daily vitamin D 1000 units qd  ___________________ 10/28/22  Last seen in clinic on 9/22/2022. Reports feeling dizzy. Has right-sided headaches. Taking phenytoin 100 mg daily (epamin) and lamotrigine 200 mg twice a day. She self-increased dose of lamotrigine and is taking 200 mg in the morning and 200 mg at night.   No clinical events concerning for seizure. Also taking vitamin D and folic acid. Does not drive.   Medications include:  lamotrigine 100 mg twice a day  phenytoin 100 mg in the morning.  ______________________ 9/22/22  Last seen in clinic on 8/12/22. Doing well. No interval events concerning for seizure. Taking lamotrigine 75 mg in the morning and 50 mg at night. No rash, no side effects.  Also taking Epamin (although level was low) - denies missing doses.  Takes folic acid 1 mg daily. Takes vitamin D 85369 q weekly. No seizures. Not driving.  ______________________________________________________________  used. Number is [id number]  Presenting to clinic with boyfriends mother. No interval events concerning for seizure. Here to review results of EEG. Last seizure more than a year ago. No warning before events. Taking phenytoin XR (epamin). Not taking high dose vitamin d, taking own supply, does not know dose. Taking folic acid. Sexually active. In future, would like children.  ______________ 6/28/22  Carmen is a 21-year-old right-handed woman with a history of epilepsy. Presenting to clinic to establish care. Unfortunately, no records are available for review. She is Surinamese speaking, originally from [facility] [facility]. She presents with her boyfriend's mother.   She thinks her last seizure was one year ago. She is prescribed epamin (phenytoin XR).   Seizure semiology: headaches, dizziness, cramping of extremities, left eye twitching, jumping, she feels she can't respond, feels stiff in arms and legs,  body starts shaking, no loss of urine, no tongue bite  Seizure frequency: 2 times a month for many years; sometimes she would go a couple months without a seizure and then would have 3 or 4 seizures in one day Intubation history: never intubated Seizure onset: around 4 or 5 years of age  History of hospitalizations: once spent one week in hospital   Medications: epamin 100 mg daily (phenytoin )  no other medications  Surgeries:  none   Social HIstory:  From [facility] lives in [place] with her partner     Works in house cleaning Does not drive Does not smoke, no alcohol, no drugs  Highschool education   _________________________  Family History  Mother: seizures, after children it passed Father: no known medical history  Brother, sisters: none   Birth history Term birth  No nuchal cord  No developmental delay  Seizures started when she was 4 or 5 years old  no known brain infection or encephalitis  no neurocysticercosis  no head injury, no loss of consciousness

## 2023-08-14 NOTE — PHYSICAL EXAM
[FreeTextEntry1] : Mental Status: alert, speech fluent. Normal affect.  CN: visual acuity, VFF  III, IV, VI, PERRL, EOMI, no nystagmus  V sensation normal to light touch  VII normal squint vs resistance, normal smile, face symmetric  VIII: normal hearing  IX, X normal gag, symmetric palate, uvula raises midline  XI normal shrug versus resistance and lateralization of head versus resistance  XII tongue symmetric, normal strength, no tremor fasciculations  Motor: no drift in upper or lower extremities, no tremor Gait : normal balance and gait

## 2023-08-14 NOTE — REASON FOR VISIT
[Follow-Up: _____] : a [unfilled] follow-up visit [Interpreters_IDNumber] : [id number] [Interpreters_FullName] : Mariah

## 2023-08-14 NOTE — DATA REVIEWED
[de-identified] : 1/24/23: MRI brain: asymmetric prominence of right temporal horn which is non-specific. Hippocampi are  symmetric in signal and morphology.  [de-identified] : 5/11/2023: Ambulatory EEG: normal 7/2022: Ambulatory EEG/routine EEG: normal (ciganek rhythm) [de-identified] : 11/8/22: b12 643, folate 17.6, creatinine 0.8, vitamin d 46.9, lamotrigine 8.4   Labs: 6/28/22: b12 568, folate 4.3 low, vitamin D. 22.8 low , comprehensive metabolic panel - alk phos 130 , creatinine 0.66  phenytoin 2.6 low

## 2023-08-14 NOTE — DISCUSSION/SUMMARY
SUBJECTIVE:   Leida Hughes is a 11 year old male, here for a routine health maintenance visit,   accompanied by his mother and sister.    Patient was roomed by: Sinai Orr LPN on 9/24/2021 at 9:05 AM    Do you have any forms to be completed?  no    SOCIAL HISTORY  Child lives with: mother, father and sister  Language(s) spoken at home: English  Recent family changes/social stressors: none noted    SAFETY/HEALTH RISK  TB exposure:           None  Declines COVID-19 and influenza vaccine  Do you monitor your child's screen use?  Yes  Cardiac risk assessment:     Family history (males <55, females <65) of angina (chest pain), heart attack, heart surgery for clogged arteries, or stroke: no    Biological parent(s) with a total cholesterol over 240:  no  Dyslipidemia risk:    None    DENTAL  Water source:  city water  Does your child have a dental provider: Yes  Has your child seen a dentist in the last 6 months: Yes   Dental health HIGH risk factors: none    Dental visit recommended: Yes  Has had dental varnish applied in past 30 days: date     Sports Physical:  No sports physical needed.    VISION   Corrective lenses: Wears glasses: worn for testing  Tool used: KIERAN  Right eye: 10/12.5 (20/25)  Left eye: 10/12.5 (20/25)  Two Line Difference: No  Visual Acuity: Pass  H Plus Lens Screening: Pass  Color vision screening: Pass  Vision Assessment: normal      HEARING  Right Ear:      1000 Hz RESPONSE- on Level:   20 db  (Conditioning sound)   1000 Hz: RESPONSE- on Level:   20 db    2000 Hz: RESPONSE- on Level:   20 db    4000 Hz: RESPONSE- on Level:   20 db    6000 Hz: RESPONSE- on Level:   20 db     Left Ear:      6000 Hz: RESPONSE- on Level:   20 db    4000 Hz: RESPONSE- on Level:   20 db    2000 Hz: RESPONSE- on Level:   20 db    1000 Hz: RESPONSE- on Level:   20 db      500 Hz: RESPONSE- on Level:   20 db     Right Ear:       500 Hz: RESPONSE- on Level:   20 db     Hearing Acuity: Pass    Hearing Assessment:  normal    HOME  No concerns    EDUCATION  School:  [employer]  thGthrthathdtheth:th th5th Days of school missed: 5 or fewer  School performance / Academic skills: doing well in school    SAFETY  Car seat belt always worn:  Yes  Helmet worn for bicycle/roller blades/skateboard?  Yes  Guns/firearms in the home: No  No safety concerns    ACTIVITIES  Do you get at least 60 minutes per day of physical activity, including time in and out of school: Yes  Extracurricular activities: no  Organized team sports: none  None    ELECTRONIC MEDIA  Media use: >2 hours/ day    DIET  Do you get at least 4 helpings of a fruit or vegetable every day: Yes  How many servings of juice, non-diet soda, punch or sports drinks per day: 1  Meals:  3, Body image/shape:  non3 and Supplements:  no    PSYCHO-SOCIAL/DEPRESSION  General screening:  No screening tool used  No concerns    SLEEP  Sleep concerns: No concerns, sleeps well through night  Bedtime on a school night: 9:00  Wake up time for school: 7:30  Sleep duration (hours/night): 10  Difficulty shutting off thoughts at night: No  Daytime naps: No    QUESTIONS/CONCERNS: None     DRUGS  Smoking:  no  Passive smoke exposure:  no  Alcohol:  no  Drugs:  no    SEXUALITY  Not sexually active at this time        PROBLEM LIST  Patient Active Problem List   Diagnosis     Over weight     Major depressive disorder with single episode, in full remission (H)     Autism     MEDICATIONS  No current outpatient medications on file.      ALLERGY  No Known Allergies    IMMUNIZATIONS  Immunization History   Administered Date(s) Administered     DTAP (<7y) 04/13/2010     DTAP-IPV, <7Y 08/22/2013     DTaP / Hep B / IPV 2009, 2009, 2009     HPV9 08/25/2020     Hep B, Peds or Adolescent 2009     HepA-ped 2 Dose 09/08/2016, 08/15/2018     Hib (PRP-T) 2009, 2009, 2009     MMR 04/13/2010     MMR/V 08/22/2013     Meningococcal (Menactra ) 08/25/2020     Pedvax-hib 08/24/2010      "Pneumo Conj 13-V (2010&after) 04/13/2010     Pneumococcal (PCV 7) 2009, 2009, 2009     TDAP Vaccine (Boostrix) 08/25/2020     Varicella 04/13/2010       HEALTH HISTORY SINCE LAST VISIT  No surgery, major illness or injury since last physical exam    ROS  Constitutional, eye, ENT, skin, respiratory, cardiac, GI, MSK, neuro, and allergy are normal except as otherwise noted.    OBJECTIVE:   EXAM  /64   Pulse 108   Temp 97.8  F (36.6  C)   Resp 22   Ht 1.759 m (5' 9.25\")   Wt (!) 110.6 kg (243 lb 12.8 oz)   SpO2 97%   BMI 35.75 kg/m    >99 %ile (Z= 3.00) based on CDC (Boys, 2-20 Years) Stature-for-age data based on Stature recorded on 9/24/2021.  >99 %ile (Z= 3.41) based on CDC (Boys, 2-20 Years) weight-for-age data using vitals from 9/24/2021.  >99 %ile (Z= 2.53) based on CDC (Boys, 2-20 Years) BMI-for-age based on BMI available as of 9/24/2021.  Blood pressure percentiles are 28 % systolic and 43 % diastolic based on the 2017 AAP Clinical Practice Guideline. This reading is in the normal blood pressure range.  GENERAL: Active, alert, in no acute distress.  SKIN: Clear. No significant rash, abnormal pigmentation or lesions  HEAD: Normocephalic  EYES: Pupils equal, round, reactive, Extraocular muscles intact. Normal conjunctivae.  EARS: Normal canals. Tympanic membranes are normal; gray and translucent.  NOSE: Normal without discharge.  MOUTH/THROAT: Clear. No oral lesions. Teeth without obvious abnormalities.  NECK: Supple, no masses.  No thyromegaly.  LYMPH NODES: No adenopathy  LUNGS: Clear. No rales, rhonchi, wheezing or retractions  HEART: Regular rhythm. Normal S1/S2. No murmurs. Normal pulses.  ABDOMEN: Soft, non-tender, not distended, no masses or hepatosplenomegaly. Bowel sounds normal.   NEUROLOGIC: No focal findings. Cranial nerves grossly intact: DTR's normal. Normal gait, strength and tone  BACK: Spine is straight, no scoliosis.  EXTREMITIES: Full range of motion, no " deformities      ASSESSMENT/PLAN:       ICD-10-CM    1. Encounter for WCC (well child check) with abnormal findings  Z00.121    2. Obesity without serious comorbidity with body mass index (BMI) greater than 99th percentile for age in pediatric patient, unspecified obesity type  E66.9 Lipid Panel    Z68.54 Comprehensive Metabolic Panel     Lipid Panel     Comprehensive Metabolic Panel     Mom declines COVID-19 and influenza vaccine  Anticipatory Guidance  The following topics were discussed:  SOCIAL/ FAMILY:    Bullying    School/ homework  NUTRITION:    Healthy food choices  HEALTH/ SAFETY:    Adequate sleep/ exercise    Dental care    Body image    Preventive Care Plan  Immunizations  See orders in EpicCare.  I reviewed the signs and symptoms of adverse effects and when to seek medical care if they should arise.  Referrals/Ongoing Specialty care: No   See other orders in EpicCare.  Cleared for sports:  Not addressed  BMI at >99 %ile (Z= 2.53) based on CDC (Boys, 2-20 Years) BMI-for-age based on BMI available as of 9/24/2021.  Pediatric Healthy Lifestyle Action Plan         Exercise and nutrition counseling performed    FOLLOW-UP:     in 1 year for a Preventive Care visit    Resources  HPV and Cancer Prevention:  What Parents Should Know  What Kids Should Know About HPV and Cancer  Goal Tracker: Be More Active  Goal Tracker: Less Screen Time  Goal Tracker: Drink More Water  Goal Tracker: Eat More Fruits and Veggies  [place] Child and Teen Checkups (C&TC) Schedule of Age-Related Screening Standards    LINDA Giordano [facility] AND [facility]   [FreeTextEntry1] : Carmen is a pleasant 22-year-old right-handed young lady with a past medical history of epilepsy. Details are unknown. Appears to be bilateral tonic clonic seizures - not clear if focal onset - no warning before. Routine and ambulatory EEG were normal -genetic generalized epilepsy less likely - (would have most likely seen epileptiform discharges on EEG if it was generalized epilepsy).  Self increased lamotrigine to 200 mg bid (was supposed to be on 100 mg bid) and continued phenytoin. This is likely why she was dizzy with headaches. She is on two medications with sodium channel blocking properties. At 10/2022 visit she stopped phenytoin. Feels much better on lamotrigine 200 mg bid. AED level within range, 8.4.  Discussed with her importance of taking medication exactly as prescribed. Repeat aEEG 5/2023 normal. She reports dizziness and some headaches. Will get AED level and will consider if at next visit if she is still dizzy, decreasing lamotrigine slightly to 150 mg bid.

## 2023-11-24 ENCOUNTER — APPOINTMENT (OUTPATIENT)
Dept: NEUROLOGY | Facility: CLINIC | Age: 23
End: 2023-11-24
Payer: MEDICAID

## 2023-11-24 VITALS
WEIGHT: 160 LBS | SYSTOLIC BLOOD PRESSURE: 123 MMHG | HEIGHT: 66 IN | DIASTOLIC BLOOD PRESSURE: 83 MMHG | BODY MASS INDEX: 25.71 KG/M2 | HEART RATE: 81 BPM | OXYGEN SATURATION: 97 %

## 2023-11-24 DIAGNOSIS — G43.109 MIGRAINE WITH AURA, NOT INTRACTABLE, W/OUT STATUS MIGRAINOSUS: ICD-10-CM

## 2023-11-24 DIAGNOSIS — Z87.898 PERSONAL HISTORY OF OTHER SPECIFIED CONDITIONS: ICD-10-CM

## 2023-11-24 PROCEDURE — 99214 OFFICE O/P EST MOD 30 MIN: CPT

## 2023-11-24 RX ORDER — MAGNESIUM GLYCINATE 100 MG
100 CAPSULE ORAL
Qty: 180 | Refills: 0 | Status: DISCONTINUED | COMMUNITY
Start: 2023-08-14 | End: 2023-11-24

## 2023-11-24 RX ORDER — LAMOTRIGINE 100 MG/1
100 TABLET ORAL
Qty: 360 | Refills: 3 | Status: DISCONTINUED | COMMUNITY
Start: 2022-10-28 | End: 2023-11-24

## 2023-11-24 RX ORDER — CHOLECALCIFEROL (VITAMIN D3) 25 MCG
25 MCG TABLET ORAL
Qty: 90 | Refills: 3 | Status: DISCONTINUED | COMMUNITY
Start: 2023-08-14 | End: 2023-11-24

## 2023-11-24 RX ORDER — RIBOFLAVIN (VITAMIN B2) 400 MG
400 TABLET ORAL
Qty: 90 | Refills: 3 | Status: DISCONTINUED | COMMUNITY
Start: 2023-08-14 | End: 2023-11-24

## 2023-12-15 PROBLEM — Z87.898 HISTORY OF SEIZURE: Status: ACTIVE | Noted: 2022-06-28

## 2023-12-15 NOTE — DATA REVIEWED
[de-identified] : 1/24/23: MRI brain: asymmetric prominence of right temporal horn which is non-specific. Hippocampi are  symmetric in signal and morphology.  [de-identified] : 5/11/2023: Ambulatory EEG: normal 7/2022: Ambulatory EEG/routine EEG: normal (ciganek rhythm) [de-identified] : 11/8/22: b12 643, folate 17.6, creatinine 0.8, vitamin d 46.9, lamotrigine 8.4   Labs: 6/28/22: b12 568, folate 4.3 low, vitamin D. 22.8 low , comprehensive metabolic panel - alk phos 130 , creatinine 0.66  phenytoin 2.6 low

## 2023-12-15 NOTE — HISTORY OF PRESENT ILLNESS
[FreeTextEntry1] : Last seen in clinic on 8/11/23. Doing well. No convulsions. Sometimes gets headaches, last one was about 2 weeks ago. Starts on one side, + phonophobia and photophobia. Sometimes gets flashing lights in vision. + nausea, phonophobia. No plans on having children, maybe in five or six years. Doing well on lamotrigine.  Driving.   Medications include:  lamotrigine 200 mg bid  ______________ Last seen in clinic in 4/2023. No interval events concerning for seizures. Does report dizziness which is the most in the afternoon and some headaches. Headaches occur two times a week. They can occur at any time of day. They can travel throughout her head, not localized. + nausea, no vomiting. Photophobia, no phonophobia. Worse with menstrual cycle and sleep deprivation.  Most nights, tries to sleep seven hours. Does not stay hydrated. Using protection, sexually active. Takes vitamin d and folic acid. Here to review results of ambulatory EEG.    Current medications include: lamotrigine 200 mg bid folic acid 1 mg  vitamin d ___________________________ 4/11/23  Last seen in clinic on 1/11/2023. Doing well. Taking lamotrigine 200 mg twice a day. Sometimes has anxiety/sensation of panic. No convulsive seizures.  Medications include:  lamotrigine 200 mg bid  folic acid 1 mg  vitamin d  _________________________________ 1/11/23   number: [id number]   Last seen in clinic on 10/10/22. Doing well. No interval events concerning for seizure. No side-effects from the medications. Does have episodes of anxiety. She is unsure if they are seizures. No longer taking phenytoin.  Has a little tremor but no more incoordination/ataxia/headaches.   Current Medications:  lamotrigine 200 mg bid  folate 1 mg daily  vitamin d 1000 units daily  ____ 11/8/22  Last seen in clinic on 10/28/22. Taking lamotrigine 200 mg bid. Not taking phenytoin anymore. Dizziness is much improved. Headaches are improved. No seizures.   Current Medications: lamotrigine 200 mg bid folate 1 mg daily vitamin D 1000 units qd  ___________________ 10/28/22  Last seen in clinic on 9/22/2022. Reports feeling dizzy. Has right-sided headaches. Taking phenytoin 100 mg daily (epamin) and lamotrigine 200 mg twice a day. She self-increased dose of lamotrigine and is taking 200 mg in the morning and 200 mg at night.   No clinical events concerning for seizure. Also taking vitamin D and folic acid. Does not drive.   Medications include:  lamotrigine 100 mg twice a day  phenytoin 100 mg in the morning.  ______________________ 9/22/22  Last seen in clinic on 8/12/22. Doing well. No interval events concerning for seizure. Taking lamotrigine 75 mg in the morning and 50 mg at night. No rash, no side effects.  Also taking Epamin (although level was low) - denies missing doses.  Takes folic acid 1 mg daily. Takes vitamin D 90614 q weekly. No seizures. Not driving.  ______________________________________________________________  used. Number is [id number]  Presenting to clinic with boyfriends mother. No interval events concerning for seizure. Here to review results of EEG. Last seizure more than a year ago. No warning before events. Taking phenytoin XR (epamin). Not taking high dose vitamin d, taking own supply, does not know dose. Taking folic acid. Sexually active. In future, would like children.  ______________ 6/28/22  Carmen is a 21-year-old right-handed woman with a history of epilepsy. Presenting to clinic to establish care. Unfortunately, no records are available for review. She is Nepalese speaking, originally from [place]. She presents with her boyfriend's mother.   She thinks her last seizure was one year ago. She is prescribed epamin (phenytoin XR).   Seizure semiology: headaches, dizziness, cramping of extremities, left eye twitching, jumping, she feels she can't respond, feels stiff in arms and legs,  body starts shaking, no loss of urine, no tongue bite  Seizure frequency: 2 times a month for many years; sometimes she would go a couple months without a seizure and then would have 3 or 4 seizures in one day Intubation history: never intubated Seizure onset: around 4 or 5 years of age  History of hospitalizations: once spent one week in hospital   Medications: epamin 100 mg daily (phenytoin )  no other medications  Surgeries:  none   Social HIstory:  From [facility] lives in [place] with her partner     Works in house cleaning Does not drive Does not smoke, no alcohol, no drugs  Highschool education   _________________________  Family History  Mother: seizures, after children it passed Father: no known medical history  Brother, sisters: none   Birth history Term birth  No nuchal cord  No developmental delay  Seizures started when she was 4 or 5 years old  no known brain infection or encephalitis  no neurocysticercosis  no head injury, no loss of consciousness          
General

## 2023-12-15 NOTE — DISCUSSION/SUMMARY
[FreeTextEntry1] : Carmen is a pleasant 22-year-old right-handed young lady with a past medical history of epilepsy. Details are unknown. Appears to be bilateral tonic clonic seizures - not clear if focal onset - no warning before. Routine and ambulatory EEG were normal -genetic generalized epilepsy less likely - (would have most likely seen epileptiform discharges on EEG if it was generalized epilepsy).  At 10/2022 visit she stopped phenytoin. Feels much better on lamotrigine 200 mg bid. AED level within range, 8.4.  Discussed with her importance of taking medication exactly as prescribed. Repeat aEEG 5/2023 normal. She reports dizziness and some headaches. Overall, doing well.

## 2023-12-15 NOTE — ASSESSMENT
[FreeTextEntry1] : Please continue lamotrigine 200 mg every 12 hours  Please take folic acid daily  I will send a medication to the pharmacy - rizatriptan, take this for severe headache, for moderate headache, you can take ibuprofen  Return to clinic in five months to see Dr. India Thomas    Seizure Safety:   Wear a helmet when biking or horseback riding No unsupervised swimming Showers rather than baths - no bath alone - risk of drowning  Adjust the temperature on hot water heater to avoid scalding If uncontrolled seizures, use microwave rather than stovetop Dont lock door in bathroom, bedroom or on places  If fall off bed with seizures, try sleeping with mattress on the floor  Use soft or padded furniture  Avoid high ladders  Take medication as prescribed Follow driving regulations of people with epilepsy.  Please visit Epilepsy Foundation : [email or web address]

## 2024-01-19 ENCOUNTER — RX RENEWAL (OUTPATIENT)
Age: 24
End: 2024-01-19

## 2024-01-19 RX ORDER — FOLIC ACID 1 MG/1
1 TABLET ORAL
Qty: 90 | Refills: 3 | Status: ACTIVE | COMMUNITY
Start: 2023-01-24 | End: 1900-01-01

## 2024-04-17 ENCOUNTER — APPOINTMENT (OUTPATIENT)
Dept: NEUROLOGY | Facility: CLINIC | Age: 24
End: 2024-04-17
Payer: COMMERCIAL

## 2024-04-17 VITALS
BODY MASS INDEX: 25.71 KG/M2 | HEIGHT: 66 IN | WEIGHT: 160 LBS | OXYGEN SATURATION: 97 % | HEART RATE: 73 BPM | DIASTOLIC BLOOD PRESSURE: 78 MMHG | SYSTOLIC BLOOD PRESSURE: 112 MMHG

## 2024-04-17 PROCEDURE — 99215 OFFICE O/P EST HI 40 MIN: CPT

## 2024-04-17 RX ORDER — FOLIC ACID 1 MG/1
1 TABLET ORAL
Qty: 90 | Refills: 3 | Status: DISCONTINUED | COMMUNITY
Start: 2023-08-14 | End: 2024-04-17

## 2024-04-17 RX ORDER — LAMOTRIGINE 200 MG/1
200 TABLET ORAL TWICE DAILY
Qty: 180 | Refills: 2 | Status: ACTIVE | COMMUNITY
Start: 2023-08-14 | End: 1900-01-01

## 2024-04-17 RX ORDER — RIZATRIPTAN BENZOATE 5 MG/1
5 TABLET ORAL DAILY
Qty: 12 | Refills: 1 | Status: ACTIVE | COMMUNITY
Start: 2023-11-24 | End: 1900-01-01

## 2024-04-17 RX ORDER — CHOLECALCIFEROL (VITAMIN D3) 25 MCG
25 MCG TABLET ORAL
Qty: 90 | Refills: 3 | Status: DISCONTINUED | COMMUNITY
Start: 2023-12-15 | End: 2024-04-17

## 2024-04-17 NOTE — DATA REVIEWED
[de-identified] : 1/24/23: MRI brain: asymmetric prominence of right temporal horn which is non-specific. Hippocampi are  symmetric in signal and morphology.  [de-identified] : 5/11/2023: Ambulatory EEG: normal 7/2022: Ambulatory EEG/routine EEG: normal (ciganek rhythm) [de-identified] : 11/8/22: b12 643, folate 17.6, creatinine 0.8, vitamin d 46.9, lamotrigine 8.4   Labs: 6/28/22: b12 568, folate 4.3 low, vitamin D. 22.8 low , comprehensive metabolic panel - alk phos 130 , creatinine 0.66  phenytoin 2.6 low

## 2024-04-17 NOTE — ASSESSMENT
[FreeTextEntry1] : Please get labwork today Continue lamotrigine 200 mg every 12 hours Continue folic acid daily Can take rizatriptan for severe headache, can combine with Advil - works better Drink 8 glasses of water a day, get 7-8 hours of sleep a night, don't skip meals  Return to clinic in six months to see Dr. India Thomas - we will get an EEG at this time   Seizure Safety:   Wear a helmet when biking or horseback riding No unsupervised swimming Showers rather than baths - no bath alone - risk of drowning  Adjust the temperature on hot water heater to avoid scalding If uncontrolled seizures, use microwave rather than stovetop Dont lock door in bathroom, bedroom or on places  If fall off bed with seizures, try sleeping with mattress on the floor  Use soft or padded furniture  Avoid high ladders  Take medication as prescribed Follow driving regulations of people with epilepsy.  Please visit Epilepsy Foundation : [email or web address]   Seizure Safety:   Wear a helmet when biking or horseback riding No unsupervised swimming Showers rather than baths - no bath alone - risk of drowning  Adjust the temperature on hot water heater to avoid scalding If uncontrolled seizures, use microwave rather than stovetop Dont lock door in bathroom, bedroom or on places  If fall off bed with seizures, try sleeping with mattress on the floor  Use soft or padded furniture  Avoid high ladders  Take medication as prescribed Follow driving regulations of people with epilepsy.  Please visit Epilepsy Foundation : [email or web address]

## 2024-04-17 NOTE — REASON FOR VISIT
[Follow-Up: _____] : a [unfilled] follow-up visit [[employer] ] : provided by [employer]   [Interpreters_IDNumber] : [id number] [Interpreters_FullName] : Jacey [FreeTextEntry3] : Ghanaian

## 2024-04-17 NOTE — HISTORY OF PRESENT ILLNESS
[FreeTextEntry1] : Last seen in clinic on 11/24/23. Doing well. No seizures. Doing well on lamotrigine. no side-effects. Does have headaches once a week. These are bilateral with photophobia, nausea and vomiting. Not associated with menstrual cycle. No known triggers.  Sometimes skips meals. Gets 7-8 hours of sleep a night. 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 No plans for children anytime soon.   Medications: lamotrigine 200 mg bid folic acid  rizatriptan  ______________________________________________________________  11/24/23  Last seen in clinic on 8/11/23. Doing well. No convulsions. Sometimes gets headaches, last one was about 2 weeks ago. Starts on one side, + phonophobia and photophobia. Sometimes gets flashing lights in vision. + nausea, phonophobia. No plans on having children, maybe in five or six years. Doing well on lamotrigine.  Driving.   Medications include:  lamotrigine 200 mg bid  ______________ Last seen in clinic in 4/2023. No interval events concerning for seizures. Does report dizziness which is the most in the afternoon and some headaches. Headaches occur two times a week. They can occur at any time of day. They can travel throughout her head, not localized. + nausea, no vomiting. Photophobia, no phonophobia. Worse with menstrual cycle and sleep deprivation.  Most nights, tries to sleep seven hours. Does not stay hydrated. Using protection, sexually active. Takes vitamin d and folic acid. Here to review results of ambulatory EEG.    Current medications include: lamotrigine 200 mg bid folic acid 1 mg  vitamin d ___________________________ 4/11/23  Last seen in clinic on 1/11/2023. Doing well. Taking lamotrigine 200 mg twice a day. Sometimes has anxiety/sensation of panic. No convulsive seizures.  Medications include:  lamotrigine 200 mg bid  folic acid 1 mg  vitamin d  _________________________________ 1/11/23   number: [id number]   Last seen in clinic on 10/10/22. Doing well. No interval events concerning for seizure. No side-effects from the medications. Does have episodes of anxiety. She is unsure if they are seizures. No longer taking phenytoin.  Has a little tremor but no more incoordination/ataxia/headaches.   Current Medications:  lamotrigine 200 mg bid  folate 1 mg daily  vitamin d 1000 units daily  ____ 11/8/22  Last seen in clinic on 10/28/22. Taking lamotrigine 200 mg bid. Not taking phenytoin anymore. Dizziness is much improved. Headaches are improved. No seizures.   Current Medications: lamotrigine 200 mg bid folate 1 mg daily vitamin D 1000 units qd  ___________________ 10/28/22  Last seen in clinic on 9/22/2022. Reports feeling dizzy. Has right-sided headaches. Taking phenytoin 100 mg daily (epamin) and lamotrigine 200 mg twice a day. She self-increased dose of lamotrigine and is taking 200 mg in the morning and 200 mg at night.   No clinical events concerning for seizure. Also taking vitamin D and folic acid. Does not drive.   Medications include:  lamotrigine 100 mg twice a day  phenytoin 100 mg in the morning.  ______________________ 9/22/22  Last seen in clinic on 8/12/22. Doing well. No interval events concerning for seizure. Taking lamotrigine 75 mg in the morning and 50 mg at night. No rash, no side effects.  Also taking Epamin (although level was low) - denies missing doses.  Takes folic acid 1 mg daily. Takes vitamin D 60284 q weekly. No seizures. Not driving.  ______________________________________________________________  used. Number is [id number]  Presenting to clinic with boyfriends mother. No interval events concerning for seizure. Here to review results of EEG. Last seizure more than a year ago. No warning before events. Taking phenytoin XR (epamin). Not taking high dose vitamin d, taking own supply, does not know dose. Taking folic acid. Sexually active. In future, would like children.  ______________ 6/28/22  Carmen is a 21-year-old right-handed woman with a history of epilepsy. Presenting to clinic to establish care. Unfortunately, no records are available for review. She is [place] speaking, originally from [place]. She presents with her boyfriend's mother.   She thinks her last seizure was one year ago. She is prescribed epamin (phenytoin XR).   Seizure semiology: headaches, dizziness, cramping of extremities, left eye twitching, jumping, she feels she can't respond, feels stiff in arms and legs,  body starts shaking, no loss of urine, no tongue bite  Seizure frequency: 2 times a month for many years; sometimes she would go a couple months without a seizure and then would have 3 or 4 seizures in one day Intubation history: never intubated Seizure onset: around 4 or 5 years of age  History of hospitalizations: once spent one week in hospital   Medications: epamin 100 mg daily (phenytoin )  no other medications  Surgeries:  none   Social History:  From [facility] lives in [place] with her partner     Works in house cleaning Does not drive Does not smoke, no alcohol, no drugs  Highschool education   _________________________  Family History  Mother: seizures, after children it passed Father: no known medical history  Brother, sisters: none   Birth history Term birth  No nuchal cord  No developmental delay  Seizures started when she was 4 or 5 years old  no known brain infection or encephalitis  no neurocysticercosis  no head injury, no loss of consciousness

## 2024-06-20 RX ORDER — MULTIVIT-MIN/IRON/FOLIC ACID/K 18-600-40
50 MCG CAPSULE ORAL
Qty: 90 | Refills: 3 | Status: ACTIVE | COMMUNITY
Start: 2024-06-20 | End: 1900-01-01

## 2024-06-21 ENCOUNTER — NON-APPOINTMENT (OUTPATIENT)
Age: 24
End: 2024-06-21

## 2024-06-21 LAB
25(OH)D3 SERPL-MCNC: 19.6 NG/ML
ALBUMIN SERPL ELPH-MCNC: 4.7 G/DL
ALP BLD-CCNC: 92 U/L
ALT SERPL-CCNC: 6 U/L
ANION GAP SERPL CALC-SCNC: 13 MMOL/L
AST SERPL-CCNC: 18 U/L
BILIRUB SERPL-MCNC: 0.8 MG/DL
BUN SERPL-MCNC: 10 MG/DL
CALCIUM SERPL-MCNC: 9.8 MG/DL
CHLORIDE SERPL-SCNC: 102 MMOL/L
CO2 SERPL-SCNC: 24 MMOL/L
CREAT SERPL-MCNC: 0.72 MG/DL
EGFR: 120 ML/MIN/1.73M2
GLUCOSE SERPL-MCNC: 100 MG/DL
LAMOTRIGINE SERPL-MCNC: 7.5 UG/ML
POTASSIUM SERPL-SCNC: 4 MMOL/L
PROT SERPL-MCNC: 7.1 G/DL
SODIUM SERPL-SCNC: 139 MMOL/L
VIT B12 SERPL-MCNC: 667 PG/ML